# Patient Record
Sex: MALE | Race: WHITE
[De-identification: names, ages, dates, MRNs, and addresses within clinical notes are randomized per-mention and may not be internally consistent; named-entity substitution may affect disease eponyms.]

---

## 2018-01-04 ENCOUNTER — HOSPITAL ENCOUNTER (OUTPATIENT)
Dept: HOSPITAL 47 - OR | Age: 43
Discharge: HOME | End: 2018-01-04
Attending: OTOLARYNGOLOGY
Payer: COMMERCIAL

## 2018-01-04 VITALS
RESPIRATION RATE: 18 BRPM | HEART RATE: 58 BPM | SYSTOLIC BLOOD PRESSURE: 124 MMHG | DIASTOLIC BLOOD PRESSURE: 81 MMHG | TEMPERATURE: 96.9 F

## 2018-01-04 VITALS — BODY MASS INDEX: 35.2 KG/M2

## 2018-01-04 DIAGNOSIS — G47.33: ICD-10-CM

## 2018-01-04 DIAGNOSIS — K21.9: ICD-10-CM

## 2018-01-04 DIAGNOSIS — Z88.0: ICD-10-CM

## 2018-01-04 DIAGNOSIS — E78.00: ICD-10-CM

## 2018-01-04 DIAGNOSIS — F45.8: ICD-10-CM

## 2018-01-04 DIAGNOSIS — R07.0: ICD-10-CM

## 2018-01-04 DIAGNOSIS — J39.2: Primary | ICD-10-CM

## 2018-01-04 DIAGNOSIS — F17.200: ICD-10-CM

## 2018-01-04 DIAGNOSIS — R73.9: ICD-10-CM

## 2018-01-04 DIAGNOSIS — R05: ICD-10-CM

## 2018-01-04 LAB — GLUCOSE BLD-MCNC: 108 MG/DL (ref 75–99)

## 2018-01-04 PROCEDURE — 88305 TISSUE EXAM BY PATHOLOGIST: CPT

## 2018-01-04 PROCEDURE — 31622 DX BRONCHOSCOPE/WASH: CPT

## 2018-01-04 PROCEDURE — 43200 ESOPHAGOSCOPY FLEXIBLE BRUSH: CPT

## 2018-01-04 PROCEDURE — 31576 LARYNGOSCOPY WITH BIOPSY: CPT

## 2018-01-04 PROCEDURE — 31237 NSL/SINS NDSC SURG BX POLYPC: CPT

## 2018-01-04 RX ADMIN — OXYMETAZOLINE HCL ONE SPRAY: 0.05 SPRAY NASAL at 08:25

## 2018-01-04 RX ADMIN — OXYMETAZOLINE HCL ONE SPRAY: 0.05 SPRAY NASAL at 08:35

## 2018-01-04 RX ADMIN — OXYMETAZOLINE HCL ONE SPRAY: 0.05 SPRAY NASAL at 08:30

## 2018-01-04 RX ADMIN — HYDROMORPHONE HYDROCHLORIDE PRN MG: 1 INJECTION, SOLUTION INTRAMUSCULAR; INTRAVENOUS; SUBCUTANEOUS at 10:40

## 2018-01-04 RX ADMIN — HYDROMORPHONE HYDROCHLORIDE PRN MG: 1 INJECTION, SOLUTION INTRAMUSCULAR; INTRAVENOUS; SUBCUTANEOUS at 10:46

## 2018-01-04 RX ADMIN — OXYMETAZOLINE HCL ONE SPRAY: 0.05 SPRAY NASAL at 08:40

## 2018-01-04 RX ADMIN — OXYMETAZOLINE HCL ONE SPRAY: 0.05 SPRAY NASAL at 08:48

## 2018-01-04 NOTE — P.OP
Date of Procedure: 01/04/18


Preoperative Diagnosis: 


Globus pharyngeus, throat pain, cough


Postoperative Diagnosis: 


Same


Procedure(s) Performed: 


Triple endoscopy and biopsy, endoscopic nasopharyngeal biopsy


Anesthesia: CAITLINA


Surgeon: Law Dill


Estimated Blood Loss (ml): 10


Pathology: other (Nasopharyngeal biopsy, bilateral base of tongue and lateral 

opharyngeal biopsy)


Condition: stable


Disposition: PACU


Indications for Procedure: 


Patient presented to the office with a persistent globus pharyngeus with 

associated dysphagia and coughing.  He drinks 2 to 42 liter bolus of dye a pop 

and smokes a pack a day for the last 20 years.  Examination reveals some 

suspicious lymphoid enlargement of the nasopharynx and hypopharynx and base of 

tongue.  Biopsy is warranted.  


Operative Findings: 


Patient had unusual lymphoid aggregate of the bilateral base of tongue and 

hypopharynx along with the nasopharyngeal area.


Description of Procedure: 


This patient was taken to the operative room and placed in the supine position.

  A general inhalation anesthetic was administered to the patient by mask and 

subsequently intubated with a cuffed endotracheal tube by the department of 

anesthesia with a functioning IV line in place.  The patient was monitored 

throughout the entire case by the department of anesthesia.  A tooth guard was 

placed and a Jako laryngoscope was placed into the patient's mouth with care to 

avoid any trauma to the lips teeth gums or tongue.  The tongue was depressed 

and the entire oropharynx and hypopharynx was evaluated including the lateral 

pharynx, piriform sinus, true and false cords postcricoid space etc. etc.  

There was some unusual swelling of lymphoid aggregates of the base of tongue 

and hypopharynx bilaterally and they were biopsied.  We then entered the long 

with the use of a bronchoscope and all 12 segments of the lungs were evaluated 

there was no signs of any endobronchial lesions.  After the bronchoscope was 

removed and esophagoscope was inserted and the entire length of the esophagus 

was evaluated from the upper to the lower esophageal sphincter.  Complete 

examination occurred and the patient did well.  We then utilized a 0 endoscope 

and inserted this into the patient's nose and evaluated the nasopharynx.  There 

was a lymphoid aggregate seen in the nasopharynx which was biopsied.  All 

instrumentation was removed the patient tolerated this well and follow-up will 

be in the office in 1 week.

## 2018-01-04 NOTE — P.OP
Date of Procedure: 01/04/18


Preoperative Diagnosis: 


Deviated nasal septum


Bilateral inferior turbinate hypertrophy with obstruction


Postoperative Diagnosis: 


Same


Procedure(s) Performed: 


Bilateral outfracture compression and submucosal resection of the inferior 

turbinates


Septoplasty


Anesthesia: CAITLINA


Surgeon: Law Dill


Estimated Blood Loss (ml): 5


Pathology: other


Condition: stable


Disposition: PACU


Indications for Procedure: 


This patient has a persistent nasal obstruction secondary to deviated nasal 

septum and large obstructive inferior turbinates.  We tried cortisone nasal 

sprays and other over-the-counter and prescription medications with no 

improvement.  He was found have a septal deviation there was obstructive along 

with obstructive inferior turbinates and surgical correction was recommended.  

All risks, benefits, and alternative therapies were discussed in detail.  

Consent was obtained and all questions were answered.


Operative Findings: 


Patient was found have a deviated nasal septum to the right with a large 

obstructive inferior turbinates


Description of Procedure: 


: This patient was taken to the operative room and placed in the supine 

position.  A general inhalation anesthetic was administered to the patient by 

the department of anesthesia with a functioning IV line in place.  The patient 

was monitored throughout the entire case by the department of anesthesia.  The 

eyes were taped shut for protection.  The patient was placed in a slight 

reverse Trendelenburg position.  The patient had previously utilize Afrin nasal 

spray preoperatively.  The nose was evaluated and the septum lateral nasal wall 

and inferior turbinates were injected with lidocaine 1% with epinephrine 1 100,

000 bilaterally.  Approximately 10 minutes were allowed wait for full 

vasoconstrictive effects to take place.





At this point a caudal incision was made over the caudal portion of the left 

septum down to the mucoperichondrium.  A mucoperichondrial flap was elevated on 

the left side and dissection was carried with use of tunnels posteriorly.  We 

then made a crossover incision through the cartilage to the contralateral side 

and for the mucoperichondrial flap development was performed to the extent of 

visualization on the contralateral side.  After the cartilage was freed with 

use of several crosshatching incisions and removal of some redundant strips of 

septal cartilage, the septum was straightened and placed back in the midline.  

The septum was sutured fixated to the ovarian groove.  Excellent straightening 

occurred and the septum was visibly straight.  Incision was closed with a 40 

rapid Vicryl.  We utilized a running nonlocking fashion for closure of the 

incision.  A quilting stitch was used to reapproximate the septal flaps with 

use of a 40 rapid Vicryl.





Intranasal splints were inserted and fixated at the end of the case.  We 

utilized Villar nasal splints.  There will be removed and the patient returns to 

the office.





Attention was then paid to the inferior turbinates.  The bilateral inferior 

turbinates were hypertrophic and obstructive.  We entered the anterior portion 

of the inferior turbinates with use of a microdebrider.  We remove bone and 

submucosal elements with use of a microdebrider bilaterally.  The inferior 

turbinates underwent a submucosal resection with removal of submucosal tissue 

and bone.  We obtained a much better and normal in size for breathing.  The 

inferior turbinates were then outfractured and compressed with a [employer] nasal 

elevator.  Excellent airway was obtained and was symmetric bilaterally.  No 

bleeding was encountered.

## 2018-03-07 NOTE — OP
OPERATIVE REPORT



DATE OF SERVICE:

01/04/2018.



PREOPERATIVE DIAGNOSES:

Nasopharyngeal mass, hypopharyngeal swelling, rule out mass, rule out malignancy,

dysphagia, cough, globus pharyngeus, tobacco use.



POSTOPERATIVE DIAGNOSES:

Nasopharyngeal mass, hypopharyngeal swelling, rule out mass, rule out malignancy,

dysphagia, cough, globus pharyngeus, tobacco use.



OPERATIVE PROCEDURE:

Triple endoscopy, i.e. panendoscopy, with biopsy removal of a left hypopharyngeal mass

and nasopharyngeal mass with endoscopic examination and biopsy of the nasopharynx.



BLOOD LOSS:

5 mL.



PATHOLOGY:

As above.



CONDITION:

Stable.



DISPOSITION:

PACU.



INDICATIONS FOR PROCEDURE:

This patient was having issues with his throat.  He had a lump feeling in his throat,

worse when he swallowed. He points to the left side of the larynx.  He has pain when he

swallows, difficulty when he swallows. He coughs.  He has a 20 pack year history of

smoking.  Examination revealed an unusual swelling to the nasopharynx and hypopharynx

and a biopsy was recommended.



DESCRIPTION OF PROCEDURE:

This patient was taken to the operating room, placed in the supine position.  A general

inhalation anesthetic was administered to the patient by mask and subsequently

intubated with a cuffed endotracheal tube by the Department of Anesthesia.  With a

functioning IV line in place, the patient was monitored throughout the entire case by

the Department of Anesthesia.  The patient was intubated with a #5 MLT Tube.  A 0

degree Lino lucinda endoscope was inserted into the nose and into the nasopharynx, with

use of biopsy forceps.  This nasopharyngeal irregularity was biopsied and sent for

permanent section.  After the nasopharynx was biopsied, the tooth guard was placed onto

the patient's upper teeth and the Jako laryngoscope was placed into the patient's mouth

with care to avoid any trauma to the lips, teeth, gums and tongue.  The mouth was

opened.  Tongue was depressed. The entire base of tongue, vallecula, epiglottis, true

and false cords, lateral pharynx, hypopharynx, oropharynx, post cricoid space, etc.

were examined.  This was viewed under microscopic visualization with a Leica

microscope.  We biopsied the bilateral hypopharyngeal areas.  There was an area on the

right and the left that appeared to be suspicious.  We removed this lymphoid type of

aggregate swelling. The patient tolerated this well.  We then inserted the bronchoscope

and all 12 segments of the lungs were evaluated. In light of the patient's cough, there

was no signs of and any endobronchial lesions.  All 12 segments of the lungs were

evaluated from the larynx to the terminal bronchi to the extent of visualization.  The

Olympus bronchoscope was removed and then we inserted an esophagoscope into the right

piriform sinus.  We visualized the esophagus from the lower to the upper esophageal

sphincter and the entire length of the esophagus was visualized, again from the upper

to the lower esophageal sphincters. No esophageal pathology was noted.  All

instrumentation was removed.



The patient tolerated this well.  Follow up will be in the office in 1 week for

recheck.  The patient is to contact me if any problems should arise.





MMODL / IJN: [id number] / Job#: [id number]

## 2021-10-12 ENCOUNTER — HOSPITAL ENCOUNTER (OUTPATIENT)
Dept: HOSPITAL 47 - LABPAT | Age: 46
Discharge: HOME | End: 2021-10-12
Attending: SURGERY
Payer: COMMERCIAL

## 2021-10-12 DIAGNOSIS — Z53.9: Primary | ICD-10-CM
